# Patient Record
Sex: FEMALE | URBAN - METROPOLITAN AREA
[De-identification: names, ages, dates, MRNs, and addresses within clinical notes are randomized per-mention and may not be internally consistent; named-entity substitution may affect disease eponyms.]

---

## 2019-03-12 ENCOUNTER — OFFICE VISIT (OUTPATIENT)
Dept: URBAN - METROPOLITAN AREA CLINIC 76 | Facility: CLINIC | Age: 59
End: 2019-03-12
Payer: COMMERCIAL

## 2019-03-12 DIAGNOSIS — H52.13 MYOPIA, BILATERAL: Primary | ICD-10-CM

## 2019-03-12 PROCEDURE — 92015 DETERMINE REFRACTIVE STATE: CPT | Performed by: OPTOMETRIST

## 2019-03-12 PROCEDURE — 92004 COMPRE OPH EXAM NEW PT 1/>: CPT | Performed by: OPTOMETRIST

## 2019-03-12 ASSESSMENT — VISUAL ACUITY
OS: 20/20
OD: 20/20

## 2019-03-12 ASSESSMENT — KERATOMETRY
OD: 44.25
OS: 45.00

## 2019-03-12 ASSESSMENT — INTRAOCULAR PRESSURE
OD: 10
OS: 12

## 2019-03-12 NOTE — IMPRESSION/PLAN
Impression: Myopia, bilateral: H52.13. Plan: A glasses prescription has been discussed and generated. -Obtain contact lens trials. A contact lens follow-up is needed to evaluate the lenses and change trials or finalize the contact lens prescription.

## 2019-03-20 ENCOUNTER — TESTING ONLY (OUTPATIENT)
Dept: URBAN - METROPOLITAN AREA CLINIC 76 | Facility: CLINIC | Age: 59
End: 2019-03-20

## 2019-03-20 PROCEDURE — 92310 CONTACT LENS FITTING OU: CPT | Performed by: OPTOMETRIST

## 2019-03-20 NOTE — IMPRESSION/PLAN
Impression: Myopia, bilateral: H52.13. CL f/u. Can't see far or near with [place]as[place]. Over-minused per OR. Pt brought habitual CL info (toric). Oasys is comfortable. Plan: A SPHERICAL contact lens prescription has been discussed and generated. Give contact lens trials to the patient. The patient should try and approve them. No contact lens follow-up is needed unless the patient has concerns with the final prescription. Consider switching to toric lenses if power change not adequate.  Possibly wrong power CLs given to pt originally? (maybe even flatter BC given since fit is loose and OR more hyperopic than expected)